# Patient Record
Sex: MALE | Race: WHITE | NOT HISPANIC OR LATINO | Employment: FULL TIME | URBAN - METROPOLITAN AREA
[De-identification: names, ages, dates, MRNs, and addresses within clinical notes are randomized per-mention and may not be internally consistent; named-entity substitution may affect disease eponyms.]

---

## 2021-02-20 ENCOUNTER — OFFICE VISIT (OUTPATIENT)
Dept: URGENT CARE | Facility: CLINIC | Age: 41
End: 2021-02-20
Payer: MEDICAID

## 2021-02-20 VITALS
OXYGEN SATURATION: 97 % | HEART RATE: 68 BPM | TEMPERATURE: 97 F | DIASTOLIC BLOOD PRESSURE: 92 MMHG | SYSTOLIC BLOOD PRESSURE: 134 MMHG

## 2021-02-20 DIAGNOSIS — R05.9 COUGH: ICD-10-CM

## 2021-02-20 DIAGNOSIS — Z11.59 SCREENING FOR VIRAL DISEASE: ICD-10-CM

## 2021-02-20 DIAGNOSIS — J06.9 ACUTE URI: Primary | ICD-10-CM

## 2021-02-20 LAB
CTP QC/QA: YES
SARS-COV-2 RDRP RESP QL NAA+PROBE: NEGATIVE

## 2021-02-20 PROCEDURE — 99203 OFFICE O/P NEW LOW 30 MIN: CPT | Mod: S$GLB,,, | Performed by: NURSE PRACTITIONER

## 2021-02-20 PROCEDURE — U0002: ICD-10-PCS | Mod: QW,S$GLB,, | Performed by: NURSE PRACTITIONER

## 2021-02-20 PROCEDURE — U0002 COVID-19 LAB TEST NON-CDC: HCPCS | Mod: QW,S$GLB,, | Performed by: NURSE PRACTITIONER

## 2021-02-20 PROCEDURE — 99203 PR OFFICE/OUTPT VISIT, NEW, LEVL III, 30-44 MIN: ICD-10-PCS | Mod: S$GLB,,, | Performed by: NURSE PRACTITIONER

## 2021-02-20 RX ORDER — DEXAMETHASONE SODIUM PHOSPHATE 100 MG/10ML
6 INJECTION INTRAMUSCULAR; INTRAVENOUS
Status: COMPLETED | OUTPATIENT
Start: 2021-02-20 | End: 2021-02-20

## 2021-02-20 RX ORDER — PROMETHAZINE HYDROCHLORIDE AND DEXTROMETHORPHAN HYDROBROMIDE 6.25; 15 MG/5ML; MG/5ML
5 SYRUP ORAL NIGHTLY PRN
Qty: 120 ML | Refills: 0 | Status: SHIPPED | OUTPATIENT
Start: 2021-02-20 | End: 2021-02-23

## 2021-02-20 RX ORDER — FLUTICASONE PROPIONATE 50 MCG
1 SPRAY, SUSPENSION (ML) NASAL DAILY
Qty: 9.9 ML | Refills: 0 | Status: SHIPPED | OUTPATIENT
Start: 2021-02-20

## 2021-02-20 RX ORDER — AMOXICILLIN AND CLAVULANATE POTASSIUM 875; 125 MG/1; MG/1
1 TABLET, FILM COATED ORAL EVERY 12 HOURS
Qty: 20 TABLET | Refills: 0 | Status: SHIPPED | OUTPATIENT
Start: 2021-02-20 | End: 2021-03-02

## 2021-02-20 RX ADMIN — DEXAMETHASONE SODIUM PHOSPHATE 6 MG: 100 INJECTION INTRAMUSCULAR; INTRAVENOUS at 03:02

## 2022-10-12 ENCOUNTER — OFFICE VISIT (OUTPATIENT)
Dept: URGENT CARE | Facility: CLINIC | Age: 42
End: 2022-10-12
Payer: MEDICAID

## 2022-10-12 VITALS — RESPIRATION RATE: 18 BRPM | HEIGHT: 68 IN | BODY MASS INDEX: 32.58 KG/M2 | WEIGHT: 215 LBS

## 2022-10-12 DIAGNOSIS — R68.89 FLU-LIKE SYMPTOMS: Primary | ICD-10-CM

## 2022-10-12 DIAGNOSIS — J10.1 INFLUENZA A: ICD-10-CM

## 2022-10-12 LAB
CTP QC/QA: YES
POC MOLECULAR INFLUENZA A AGN: POSITIVE
POC MOLECULAR INFLUENZA B AGN: NEGATIVE

## 2022-10-12 PROCEDURE — 1160F RVW MEDS BY RX/DR IN RCRD: CPT | Mod: CPTII,S$GLB,, | Performed by: FAMILY MEDICINE

## 2022-10-12 PROCEDURE — 1159F PR MEDICATION LIST DOCUMENTED IN MEDICAL RECORD: ICD-10-PCS | Mod: CPTII,S$GLB,, | Performed by: FAMILY MEDICINE

## 2022-10-12 PROCEDURE — 4010F ACE/ARB THERAPY RXD/TAKEN: CPT | Mod: CPTII,S$GLB,, | Performed by: FAMILY MEDICINE

## 2022-10-12 PROCEDURE — 1159F MED LIST DOCD IN RCRD: CPT | Mod: CPTII,S$GLB,, | Performed by: FAMILY MEDICINE

## 2022-10-12 PROCEDURE — 87502 POCT INFLUENZA A/B MOLECULAR: ICD-10-PCS | Mod: QW,S$GLB,, | Performed by: FAMILY MEDICINE

## 2022-10-12 PROCEDURE — 99214 PR OFFICE/OUTPT VISIT, EST, LEVL IV, 30-39 MIN: ICD-10-PCS | Mod: S$GLB,,, | Performed by: FAMILY MEDICINE

## 2022-10-12 PROCEDURE — 1160F PR REVIEW ALL MEDS BY PRESCRIBER/CLIN PHARMACIST DOCUMENTED: ICD-10-PCS | Mod: CPTII,S$GLB,, | Performed by: FAMILY MEDICINE

## 2022-10-12 PROCEDURE — 99214 OFFICE O/P EST MOD 30 MIN: CPT | Mod: S$GLB,,, | Performed by: FAMILY MEDICINE

## 2022-10-12 PROCEDURE — 4010F PR ACE/ARB THEARPY RXD/TAKEN: ICD-10-PCS | Mod: CPTII,S$GLB,, | Performed by: FAMILY MEDICINE

## 2022-10-12 PROCEDURE — 87502 INFLUENZA DNA AMP PROBE: CPT | Mod: QW,S$GLB,, | Performed by: FAMILY MEDICINE

## 2022-10-12 RX ORDER — ALBUTEROL SULFATE 90 UG/1
AEROSOL, METERED RESPIRATORY (INHALATION)
COMMUNITY
Start: 2022-06-22

## 2022-10-12 RX ORDER — CODEINE PHOSPHATE AND GUAIFENESIN 10; 100 MG/5ML; MG/5ML
10 SOLUTION ORAL NIGHTLY PRN
Qty: 100 ML | Refills: 0 | Status: SHIPPED | OUTPATIENT
Start: 2022-10-12 | End: 2022-10-22

## 2022-10-12 RX ORDER — DEXTROMETHORPHAN HBR, GUAIFENESIN AND PSEUDOEPHEDRINE HCL 60; 380; 20 MG/1; MG/1; MG/1
1 TABLET ORAL EVERY 6 HOURS PRN
Qty: 20 TABLET | Refills: 0 | Status: SHIPPED | OUTPATIENT
Start: 2022-10-12

## 2022-10-12 RX ORDER — OSELTAMIVIR PHOSPHATE 75 MG/1
75 CAPSULE ORAL 2 TIMES DAILY
Qty: 10 CAPSULE | Refills: 0 | Status: SHIPPED | OUTPATIENT
Start: 2022-10-12 | End: 2022-10-17

## 2022-10-12 RX ORDER — LISINOPRIL 20 MG/1
20 TABLET ORAL DAILY
COMMUNITY
Start: 2022-07-28

## 2022-10-12 RX ORDER — NAPROXEN 500 MG/1
500 TABLET ORAL 2 TIMES DAILY WITH MEALS
Qty: 20 TABLET | Refills: 0 | Status: SHIPPED | OUTPATIENT
Start: 2022-10-12

## 2022-10-12 NOTE — LETTER
October 12, 2022  Jaxon Sylvester  [address] [facility] [place] [address]                [facility]  [address] [facility], [address]  [place] [address]  Phone: [phone]  Fax: [phone] Jaxon Sylvester was seen and treated in our Urgent Care department on 10/12/2022. He may return to work in 5 - 7 days.      If you have any questions or concerns, please don't hesitate to call.        Sincerely,        Venkat Farfan MD

## 2022-10-12 NOTE — PATIENT INSTRUCTIONS
Please drink plenty of fluids.  Please get plenty of rest.  Please return here or go to the Emergency Department for any concerns or worsening of condition.  If you were given wait & see antibiotics, please wait 3-5 days before taking them, and only take them if your symptoms have worsened or not improved.  If you do begin taking the antibiotics, please take them to completion.  If you were prescribed antibiotics, please take them to completion.  If you were prescribed a narcotic medication, do not drive or operate heavy equipment or machinery while taking these medications.    You were given a decongestant (RESCON or POLY VENT Dm).  If your insurance does not cover it or you cannot afford it, it is ok to use the over the counter products listed below.  If you do not have Hypertension or any history of palpitations, it is ok to take over the counter Sudafed or Mucinex D or Allegra-D or Claritin-D or Zyrtec-D.  If you do take one of the above, it is ok to combine that with plain over the counter Mucinex or Allegra or Claritin or Zyrtec.  If for example you are taking Zyrtec -D, you can combine that with Mucinex, but not Mucinex-D.  If you are taking Mucinex-D, you can combine that with plain Allegra or Claritin or Zyrtec.   If you do have Hypertension or palpitations, it is safe to take Coricidin HBP for relief of sinus symptoms.    We recommend you take over the counter Flonase (Fluticasone) or another nasally inhaled steroid unless you are already taking one.  Nasal irrigation with a saline spray or Netti Pot like device per their directions is also recommended.  If not allergic, please take over the counter Tylenol (Acetaminophen) and/or Motrin (Ibuprofen) as directed for control of pain and/or fever.    Robitussin DM 2 teas every 4 hours as needed for cough.  If you  smoke, please stop smoking.    Please follow up with your primary care doctor or specialist as needed.  Zeeshan Inman MD  [phone]    You must  understand that you have received treatment at an Urgent Care facility only, and that you may be  released before all of your medical problems are known or treated. Urgent Care facilities are not equipped to  handle life threatening emergencies. It is recommended that you seek care at an Emergency Department for  further evaluation of worsening or concerning symptoms, or possibly life threatening conditions as  discussed.    Influenza (Adult)    Influenza is also called the flu. It is a viral illness that affects the air passages of your lungs. It is different from the common cold. The flu can easily be passed from one to person to another. It may be spread through the air by coughing and sneezing. Or it can be spread by touching the sick person and then touching your own eyes, nose, or mouth.  The flu starts 1 to 3 days after you are exposed to the flu virus. It may last for 1 to 2 weeks. You usually dont need to take antibiotics unless you have a complication. This might be an ear or sinus infection or pneumonia.  Symptoms of the flu may be mild or severe. They can include extreme tiredness (wanting to stay in bed all day), chills, fevers, muscle aches, soreness with eye movement, headache, and a dry, hacking cough.  Home care  Follow these guidelines when caring for yourself at home:  Avoid being around cigarette smoke, whether yours or other peoples.  Acetaminophen or ibuprofen will help ease your fever, muscle aches, and headache. Dont give aspirin to anyone younger than 18 who has the flu. Aspirin can harm the liver.  Nausea and loss of appetite are common with the flu. Eat light meals. Drink 6 to 8 glasses of liquids every day. Good choices are water, sport drinks, soft drinks without caffeine, juices, tea, and soup. Extra fluids will also help loosen secretions in your nose and lungs.  Over-the-counter cold medicines will not make the flu go away faster. But the medicines may help with coughing, sore  throat, and congestion in your nose and sinuses. Dont use a decongestant if you have high blood pressure.  Stay home until your fever has been gone for at least 24 hours without using medicine to reduce fever.  Follow-up care  Follow up with your healthcare provider, or as advised, if you are not getting better over the next week.  If you are 65 or older, talk with your provider about getting a pneumococcal vaccine every 5 years. You should also get this vaccine if you have chronic asthma or COPD. All adults should get a flu vaccine every fall. Ask your provider about this.  When to seek medical advice  Call your healthcare provider right away if any of these occur:  Cough with lots of colored sputum (mucus) or blood in your sputum  Chest pain, shortness of breath, wheezing, or difficulty breathing  Severe headache, or face, neck, or ear pain  New rash with fever  Fever of 100.4°F (38°C) or higher, or as directed by your healthcare provider  Confusion, behavior change, or seizure  Severe weakness or dizziness  You get a fever or cough after getting better for a few days  Date Last Reviewed: 12/23/2014  © 0292-6301 [employer]. [address] [place] [address]. All rights reserved. This information is not intended as a substitute for professional medical care. Always follow your healthcare professional's instructions.

## 2022-10-12 NOTE — PROGRESS NOTES
"Subjective:       Patient ID: Jaxon Sylvester is a 42 y.o. male.    Vitals:  height is 5' 8" (1.727 m) and weight is 97.5 kg (215 lb). His respiration is 18.     Chief Complaint: Sinus Problem    Sinus Problem  This is a new problem. The current episode started yesterday. The problem has been gradually worsening since onset. There has been no fever. His pain is at a severity of 10/10. The pain is severe. Associated symptoms include chills, congestion (bloody), coughing (brownish,green), ear pain (fullness), headaches, shortness of breath and sinus pressure. Pertinent negatives include no hoarse voice, neck pain, sneezing, sore throat or swollen glands. (Chest discomfort, and rib pain) Treatments tried: claritin.     Constitution: Positive for chills.   HENT:  Positive for ear pain (fullness), congestion (bloody) and sinus pressure. Negative for sore throat.    Neck: Negative for neck pain.   Cardiovascular: Negative.    Eyes: Negative.    Respiratory:  Positive for cough (brownish,green) and shortness of breath.    Gastrointestinal: Negative.    Endocrine: negative.   Genitourinary: Negative.    Musculoskeletal: Negative.    Skin: Negative.    Allergic/Immunologic: Negative.  Negative for sneezing.   Neurological:  Positive for headaches.   Hematologic/Lymphatic: Negative.    Psychiatric/Behavioral: Negative.       Objective:      Physical Exam   Constitutional: He is oriented to person, place, and time. He appears well-developed. He is cooperative.  Non-toxic appearance. He does not appear ill. No distress.   HENT:   Head: Normocephalic and atraumatic.   Ears:   Right Ear: Hearing, tympanic membrane, external ear and ear canal normal.   Left Ear: Hearing, tympanic membrane, external ear and ear canal normal.   Nose: Nose normal. No mucosal edema, rhinorrhea or nasal deformity. No epistaxis. Right sinus exhibits no maxillary sinus tenderness and no frontal sinus tenderness. Left sinus exhibits no maxillary sinus " tenderness and no frontal sinus tenderness.   Mouth/Throat: Uvula is midline, oropharynx is clear and moist and mucous membranes are normal. No trismus in the jaw. Normal dentition. No uvula swelling. No oropharyngeal exudate, posterior oropharyngeal edema or posterior oropharyngeal erythema.   Eyes: Conjunctivae and lids are normal. No scleral icterus.   Neck: Trachea normal and phonation normal. Neck supple. No edema present. No erythema present. No neck rigidity present.   Cardiovascular: Normal rate, regular rhythm, normal heart sounds and normal pulses.   Pulmonary/Chest: Effort normal and breath sounds normal. No respiratory distress. He has no decreased breath sounds. He has no rhonchi.   Abdominal: Normal appearance.   Musculoskeletal: Normal range of motion.         General: No deformity. Normal range of motion.   Neurological: He is alert and oriented to person, place, and time. He exhibits normal muscle tone. Coordination normal.   Skin: Skin is warm, dry, intact, not diaphoretic and not pale.   Psychiatric: His speech is normal and behavior is normal. Judgment and thought content normal.   Nursing note and vitals reviewed.      Results for orders placed or performed in visit on 10/12/22   POCT Influenza A/B MOLECULAR   Result Value Ref Range    POC Molecular Influenza A Ag Positive (A) Negative, Not Reported    POC Molecular Influenza B Ag Negative Negative, Not Reported     Acceptable Yes        Assessment:       1. Flu-like symptoms    2. Influenza A          Plan:         Flu-like symptoms  -     POCT Influenza A/B MOLECULAR    Influenza A  -     naproxen (NAPROSYN) 500 MG tablet; Take 1 tablet (500 mg total) by mouth 2 (two) times daily with meals.  Dispense: 20 tablet; Refill: 0  -     oseltamivir (TAMIFLU) 75 MG capsule; Take 1 capsule (75 mg total) by mouth 2 (two) times daily. for 5 days  Dispense: 10 capsule; Refill: 0  -     pseudoephedrine-DM-guaiFENesin (POLY-VENT DM) 60-  mg Tab; Take 1 tablet by mouth every 6 (six) hours as needed.  Dispense: 20 tablet; Refill: 0  -     guaiFENesin-codeine 100-10 mg/5 ml (TUSSI-ORGANIDIN NR)  mg/5 mL syrup; Take 10 mLs by mouth nightly as needed for Cough.  Dispense: 100 mL; Refill: 0        Please drink plenty of fluids.  Please get plenty of rest.  Please return here or go to the Emergency Department for any concerns or worsening of condition.  If you were given wait & see antibiotics, please wait 3-5 days before taking them, and only take them if your symptoms have worsened or not improved.  If you do begin taking the antibiotics, please take them to completion.  If you were prescribed antibiotics, please take them to completion.  If you were prescribed a narcotic medication, do not drive or operate heavy equipment or machinery while taking these medications.    You were given a decongestant (RESCON or POLY VENT Dm).  If your insurance does not cover it or you cannot afford it, it is ok to use the over the counter products listed below.  If you do not have Hypertension or any history of palpitations, it is ok to take over the counter Sudafed or Mucinex D or Allegra-D or Claritin-D or Zyrtec-D.  If you do take one of the above, it is ok to combine that with plain over the counter Mucinex or Allegra or Claritin or Zyrtec.  If for example you are taking Zyrtec -D, you can combine that with Mucinex, but not Mucinex-D.  If you are taking Mucinex-D, you can combine that with plain Allegra or Claritin or Zyrtec.   If you do have Hypertension or palpitations, it is safe to take Coricidin HBP for relief of sinus symptoms.    We recommend you take over the counter Flonase (Fluticasone) or another nasally inhaled steroid unless you are already taking one.  Nasal irrigation with a saline spray or Netti Pot like device per their directions is also recommended.  If not allergic, please take over the counter Tylenol (Acetaminophen) and/or Motrin  (Ibuprofen) as directed for control of pain and/or fever.    Robitussin DM 2 teas every 4 hours as needed for cough.  If you  smoke, please stop smoking.    Please follow up with your primary care doctor or specialist as needed.  Zeeshan Inman MD  [phone]    You must understand that you have received treatment at an Urgent Care facility only, and that you may be  released before all of your medical problems are known or treated. Urgent Care facilities are not equipped to  handle life threatening emergencies. It is recommended that you seek care at an Emergency Department for  further evaluation of worsening or concerning symptoms, or possibly life threatening conditions as  discussed.

## 2022-10-13 ENCOUNTER — TELEPHONE (OUTPATIENT)
Dept: URGENT CARE | Facility: CLINIC | Age: 42
End: 2022-10-13
Payer: MEDICAID

## 2022-10-13 NOTE — TELEPHONE ENCOUNTER
Returned patients voicemail regarding his high blood pressure and Tamiflu.  He wanted to make sure that it was ok to take the Tamiflu with high blood pressure.  Per Dr. Farfan, he stated that it was ok to take the Tamiflu with the high blood pressure.

## 2022-10-31 ENCOUNTER — OFFICE VISIT (OUTPATIENT)
Dept: OPHTHALMOLOGY | Facility: CLINIC | Age: 42
End: 2022-10-31
Payer: MEDICAID

## 2022-10-31 DIAGNOSIS — H18.891 CORNEAL RUST RING OF RIGHT EYE: Primary | ICD-10-CM

## 2022-10-31 PROCEDURE — 65222 PR REMV F.B.,EYE,CORNEA,SLIT LAMP: ICD-10-PCS | Mod: S$PBB,RT,, | Performed by: OPHTHALMOLOGY

## 2022-10-31 PROCEDURE — 99999 PR PBB SHADOW E&M-EST. PATIENT-LVL II: CPT | Mod: PBBFAC,,, | Performed by: OPHTHALMOLOGY

## 2022-10-31 PROCEDURE — 99212 OFFICE O/P EST SF 10 MIN: CPT | Mod: PBBFAC | Performed by: OPHTHALMOLOGY

## 2022-10-31 PROCEDURE — 1160F RVW MEDS BY RX/DR IN RCRD: CPT | Mod: CPTII,,, | Performed by: OPHTHALMOLOGY

## 2022-10-31 PROCEDURE — 4010F PR ACE/ARB THEARPY RXD/TAKEN: ICD-10-PCS | Mod: CPTII,,, | Performed by: OPHTHALMOLOGY

## 2022-10-31 PROCEDURE — 1159F MED LIST DOCD IN RCRD: CPT | Mod: CPTII,,, | Performed by: OPHTHALMOLOGY

## 2022-10-31 PROCEDURE — 99999 PR PBB SHADOW E&M-EST. PATIENT-LVL II: ICD-10-PCS | Mod: PBBFAC,,, | Performed by: OPHTHALMOLOGY

## 2022-10-31 PROCEDURE — 92002 PR EYE EXAM, NEW PATIENT,INTERMED: ICD-10-PCS | Mod: 25,S$PBB,, | Performed by: OPHTHALMOLOGY

## 2022-10-31 PROCEDURE — 92002 INTRM OPH EXAM NEW PATIENT: CPT | Mod: 25,S$PBB,, | Performed by: OPHTHALMOLOGY

## 2022-10-31 PROCEDURE — 1159F PR MEDICATION LIST DOCUMENTED IN MEDICAL RECORD: ICD-10-PCS | Mod: CPTII,,, | Performed by: OPHTHALMOLOGY

## 2022-10-31 PROCEDURE — 4010F ACE/ARB THERAPY RXD/TAKEN: CPT | Mod: CPTII,,, | Performed by: OPHTHALMOLOGY

## 2022-10-31 PROCEDURE — 65222 REMOVE FOREIGN BODY FROM EYE: CPT | Mod: PBBFAC,RT | Performed by: OPHTHALMOLOGY

## 2022-10-31 PROCEDURE — 1160F PR REVIEW ALL MEDS BY PRESCRIBER/CLIN PHARMACIST DOCUMENTED: ICD-10-PCS | Mod: CPTII,,, | Performed by: OPHTHALMOLOGY

## 2022-10-31 PROCEDURE — 65222 REMOVE FOREIGN BODY FROM EYE: CPT | Mod: S$PBB,RT,, | Performed by: OPHTHALMOLOGY

## 2022-10-31 NOTE — PROGRESS NOTES
SUBJECTIVE  Jaxon José Sylvester is 42 y.o. male  Uncorrected distance visual acuity was 20/70 in the right eye and 20/200 in the left eye.   Chief Complaint   Patient presents with    Eye Injury     Pt reports for rust ring removal. Pt states he had 3 pieces of metal stuck in eye, went to ER for removal, but rust ring still persists and was referred to use equipment here. OD feels itchy and irritation, but va stable.           HPI     Eye Injury     Additional comments: Pt reports for rust ring removal. Pt states he had 3   pieces of metal stuck in eye, went to ER for removal, but rust ring still   persists and was referred to use equipment here. OD feels itchy and   irritation, but va stable.           Last edited by José Flanagan on 10/31/2022  2:57 PM.         Assessment /Plan :  1. Corneal rust ring of right eye PROCEDURE: CORNEAL FOREIGN BODY REMOVAL OD    Risk, benefits, and alternatives dicussed and patient requests removal of an embedded foreign body from OD.  Aktaine gtts applied repeatedly to affected eye. Next the pt was positioned at the slit lamp. A cotton-tip applicator was used to sweep off the superficially located foreign body. and A nieves was used to remove the residual rust ring until a clear corneal bed created was created.           Plan:  1. Other to treated eye qid x 5 days.  2. RTC in 2-3 days if not improving or prn worsening

## 2023-04-19 DIAGNOSIS — M51.36 DDD (DEGENERATIVE DISC DISEASE), LUMBAR: Primary | ICD-10-CM

## 2023-05-26 ENCOUNTER — CLINICAL SUPPORT (OUTPATIENT)
Dept: REHABILITATION | Facility: HOSPITAL | Age: 43
End: 2023-05-26
Payer: MEDICAID

## 2023-05-26 DIAGNOSIS — M54.50 CHRONIC RIGHT-SIDED LOW BACK PAIN WITHOUT SCIATICA: ICD-10-CM

## 2023-05-26 DIAGNOSIS — M53.86 DECREASED RANGE OF MOTION OF LUMBAR SPINE: ICD-10-CM

## 2023-05-26 DIAGNOSIS — G89.29 CHRONIC RIGHT-SIDED LOW BACK PAIN WITHOUT SCIATICA: ICD-10-CM

## 2023-05-26 DIAGNOSIS — R29.898 WEAKNESS OF BOTH LOWER EXTREMITIES: ICD-10-CM

## 2023-05-26 PROCEDURE — 97161 PT EVAL LOW COMPLEX 20 MIN: CPT | Mod: PN

## 2023-05-26 PROCEDURE — 97110 THERAPEUTIC EXERCISES: CPT | Mod: PN

## 2023-05-26 NOTE — PROGRESS NOTES
[facility]  Physical Therapy Initial Evaluation    Date: 5/26/2023   Name: Jaxon Sylvester  Clinic Number: [id number]    Therapy Diagnosis:   Encounter Diagnoses   Name Primary?    Weakness of both lower extremities     Chronic right-sided low back pain without sciatica     Decreased range of motion of lumbar spine      Physician: Alessandra Welch FNP    Physician Orders: PT Eval and Treat   Medical Diagnosis from Referral:    M51.36 (ICD-10-CM) - DDD (degenerative disc disease), lumbar M51.36 (ICD-10-CM) - DDD (degenerative disc disease), lumbar   Evaluation Date: 5/26/2023  Authorization Period Expiration: 7/1/2023  Plan of Care Expiration: 8/26/2023  Visit # / Visits authorized: 1/ 1    PN DUE: 6/26/2023    Time In: 8:15 AM  Time Out: 9:00 AM  Total Appointment Time (timed & untimed codes): 45 minutes    Precautions: Standard    Subjective   Date of onset: Pt reports that his low back pain began about 4 years ago. He said that he was working out and was dead lifting 620 pounds but was unable to get treatment for back pain due to being in alf. He reports that it took about a week to recover before he was able to workout again.     History of current condition - Jaxon reports: right low back pain that has progressively gotten worse over the past 4 years. He states that he has not been able to workout in the past 9 months due to back pain. He reports no numbness or tingling in bilateral lower extremities. He has increased pain and difficulty with back exercises, sleeping, and driving.     Medical History:   No past medical history on file.    Surgical History:   Jaxon Sylvester  has a past surgical history that includes Hernia repair.    Medications:   Jaxon has a current medication list which includes the following prescription(s): albuterol, fluticasone propionate, hydrocodone-acetaminophen, lisinopril, naproxen, and poly-vent dm.    Allergies:   Review of patient's allergies  indicates:  No Known Allergies     Imaging: none    Prior Therapy: No   Social History:  lives with their family  Occupation:   Prior Level of Function: Independent with functional mobility, recreational activities, work duties, house chores, and ADLs  Current Level of Function: Increased pain with exercise, driving and in the mornings     Pain:  Current 5/10, worst 10/10, best 4/10   Location: right  low back oain  Description: Aching, Dull, and Burning  Aggravating Factors: Sitting, Bending, Morning, Extension, Flexing, and Lifting  Easing Factors: relaxation, ice, lying down, and heating pad    Patients goals: decreased right low back pain, increased ease with working out, return to gym     Objective   Observation: pt pleasant and appropriate throughout evaluation; A&O x 3     Sensation: Pt denies any numbness or tingling in bilateral lower extremities.bilateral lower extremities intact to light touch. Pt reports occasional numbness and tingling of right upper extremity     Lumbar Range of Motion:    % of normal motion Pain   Flexion 75%   pain        Extension 100%   No pain        Left Side Bending 50% Pain on right side        Right Side Bending 50% No pain        Left rotation   75% some pain        Right Rotation   75% Some pain             Lower Extremity Strength  Right LE  Left LE    Knee extension: 5/5 Knee extension: 5/5   Knee flexion: 4/5 Knee flexion: 4/5   Hip flexion: 4/5; pain in right low back  Hip flexion: 4+/5   Hip extension:  4/5 Hip extension: 4/5   Hip abduction: 4+/5 Hip abduction: 4+/5   Hip adduction: 4/5 Hip adduction 4/5   Ankle dorsiflexion: 5/5 Ankle dorsiflexion: 5/5   Ankle plantarflexion: 5/5 Ankle plantarflexion: 5/5     Special Tests:  -Repeated Flexion: increased pain  -Repeated Ext: increased pain  -Bridge Test: no pain or difficulty     Movement Analysis:   - Squat: Pt with proper form during squat   - Lunges: poor form with lunging exercise with increased  "pain  - Heel raises: Pt with no difficulty, deviations, decreased heel rise or pain with heel raises  - Step downs: pt with no difficulty or pain with step downs bilaterally   - SL stance: pt able to hold bilateral SL balance for greater than 30 seconds    Neuro Dynamic Testing:    Sciatic nerve:      SLR: R = Negative      L = Negative     Increased right low back pain with bilateral SLR past 90 degrees       Femoral Nerve:    Femoral nerve test: Negative    Joint Mobility: normal lumabr mobility with PA's; increased pain over L4-L5    Palpation: TTP over Sp of L5, right lumbar paraspinals    Flexibility: decreased hamstring flexibility bilaterally.     Function: FOTO  54% LIMITATION    TREATMENT   Treatment Time In: 8:45 AM  Treatment Time Out: 9:00 AM  Total Treatment time (time-based codes) separate from Evaluation: 15 minutes    Jaxon received therapeutic exercises to develop strength, endurance, ROM, flexibility, posture, and core stabilization for 15 minutes including:  F4 LTR x 10  SKTC 10" x 10  (B) Piriformis stretch 2 x 30"  (B) hamstring stretch 2 x 30"  Alternating isometrics 10" x 10  PPT 10 x 10"    Home Exercises and Patient Education Provided    Education provided:   -T POC  - HEP  - PT prognosis and diagnosis  - all questions and concerns answered     Written Home Exercises Provided: yes.  Exercises were reviewed and Jaxon was able to demonstrate them prior to the end of the session.  Jaxon demonstrated good  understanding of the education provided.     See EMR under Patient Instructions for exercises provided  5/26/2023 .    Assessment   Jaxon is a 42 y.o. male referred to outpatient Physical Therapy with a medical diagnosis of M51.36 (ICD-10-CM) - DDD (degenerative disc disease), lumbar .     The patient presents with impairments which include impairments list: ROM, strength, and core strength and stability.  These impairments are limiting patient's ability to exercise, sleep, and sit for prolonged " time without increased pain.     Pt prognosis is Good due to personal factors and co-morbidities listed below. Pt will benefit from skilled outpatient Physical Therapy to address the deficits stated above and in the chart below, provide pt/family education, and to maximize pt's level of independence.     Patient prognosis is Good.   Patientt will benefit from skilled outpatient Physical Therapy to address the deficits stated above and in the chart below, provide patient /family education, and to maximize patientt's level of independence.     Plan of care discussed with patient: Yes  Patient's spiritual, cultural and educational needs considered and patient is agreeable to the plan of care and goals as stated below:     Anticipated Barriers for therapy: none    Medical Necessity is demonstrated by the following  History  Co-morbidities and personal factors that may impact the plan of care Co-morbidities:   See above    Personal Factors:   age     low   Examination  Body Structures and Functions, activity limitations and participation restrictions that may impact the plan of care Body Regions:   back    Body Systems:    ROM  strength  gait    Participation Restrictions:   Sleeping  Exercise  driving    Activity limitations:   Learning and applying knowledge  no deficits    General Tasks and Commands  no deficits    Communication  no deficits    Mobility  lifting and carrying objects  driving (bike, car, motorcycle)    Self care  no deficits    Domestic Life  no deficits    Interactions/Relationships  no deficits    Life Areas  no deficits    Community and Social Life  no deficits         low   Clinical Presentation stable and uncomplicated low   Decision Making/ Complexity Score: low     Goals:  Short Term Goals: 5 weeks   Pt will demonstrate improved pain by reports of less than or equal to 7/10 worst pain on the verbal rating scale in order to progress toward maximal functional ability and improve QOL.  Patient will  demonstrate independence with HEP in order to progress toward functional independence  Pt will improve lumbar range of motion to within functional limits for improved functional QOL  Pt will exhibit proper form during body weight lunge with less than 3/10 pain in low back for progress to going back to gym.     Long Term Goals: 10 weeks   Patient will demonstrate improved function as indicated by a functional limitation score of 42% on the FOTO.  Pt will demonstrate improved pain by reports of less than or equal to 5/10 worst pain on the verbal rating scale in order to progress toward maximal functional ability and improve QOL.  Pt will improve bilateral lower extremity strength to 5/5 for increased functional lower extremity strength needed for lifting and exercise tasks.   Pt will improve lumbar range of motion to within functional limits with no pain for improved functional mobility.   Pt will be able to report he has returned to working out with less than 3/10 pain in crescencio back for return to prior level of function.   Plan   Plan of care Certification: 5/26/2023 to 8/26/2023.    Outpatient Physical Therapy 2 times weekly for 10 weeks to include the following interventions: Aquatic Therapy, Cervical/Lumbar Traction, Electrical Stimulation  , Gait Training, Manual Therapy, Moist Heat/ Ice, Neuromuscular Re-ed, Orthotic Management and Training, Patient Education, Self Care, Therapeutic Activities, Therapeutic Exercise, and Ultrasound. And any other therapies and modalities as clinically indicated and appropriate, including but not limited to FDN and telehealth. Pt may be seen by PTA as a part of pt's care team.     Karime Dominguez, PT, DPT  5/26/2023